# Patient Record
Sex: FEMALE | Race: WHITE | NOT HISPANIC OR LATINO | URBAN - METROPOLITAN AREA
[De-identification: names, ages, dates, MRNs, and addresses within clinical notes are randomized per-mention and may not be internally consistent; named-entity substitution may affect disease eponyms.]

---

## 2022-02-25 ENCOUNTER — APPOINTMENT (OUTPATIENT)
Dept: URBAN - METROPOLITAN AREA CLINIC 232 | Age: 77
Setting detail: DERMATOLOGY
End: 2022-02-25

## 2022-02-25 DIAGNOSIS — L71.0 PERIORAL DERMATITIS: ICD-10-CM

## 2022-02-25 PROCEDURE — 99203 OFFICE O/P NEW LOW 30 MIN: CPT

## 2022-02-25 PROCEDURE — OTHER MEDICATION COUNSELING: OTHER

## 2022-02-25 PROCEDURE — OTHER COUNSELING: OTHER

## 2022-02-25 PROCEDURE — OTHER MIPS QUALITY: OTHER

## 2022-02-25 PROCEDURE — OTHER TREATMENT REGIMEN: OTHER

## 2022-02-25 PROCEDURE — OTHER ADDITIONAL NOTES: OTHER

## 2022-02-25 PROCEDURE — OTHER PRESCRIPTION: OTHER

## 2022-02-25 RX ORDER — CLINDAMYCIN PHOSPHATE 10 MG/ML
SOLUTION TOPICAL
Qty: 60 | Refills: 3 | Status: ERX | COMMUNITY
Start: 2022-02-25

## 2022-02-25 RX ORDER — MINOCYCLINE HYDROCHLORIDE 100 MG/1
CAPSULE ORAL
Qty: 60 | Refills: 1 | Status: ERX | COMMUNITY
Start: 2022-02-25

## 2022-02-25 ASSESSMENT — LOCATION DETAILED DESCRIPTION DERM: LOCATION DETAILED: LEFT UPPER CUTANEOUS LIP

## 2022-02-25 ASSESSMENT — LOCATION ZONE DERM: LOCATION ZONE: LIP

## 2022-02-25 ASSESSMENT — LOCATION SIMPLE DESCRIPTION DERM: LOCATION SIMPLE: LEFT LIP

## 2022-02-25 NOTE — PROCEDURE: ADDITIONAL NOTES
Additional Notes: Discussed with patient possible causes could be mint,cinnamon, blue dyes or heavy cream.\\nRecommended to discontinue aquaphor for the face due to the cream being to heavy.\\nPatient states she has been consuming more cinnamon than before.
Detail Level: Detailed
Render Risk Assessment In Note?: no

## 2022-02-25 NOTE — HPI: RASH
What Type Of Note Output Would You Prefer (Optional)?: Standard Output
How Severe Is Your Rash?: mild
Is This A New Presentation, Or A Follow-Up?: Rash
Additional History: Patient ronnie has been to two separate dermatologist and have been told two separate diagnosis. Patient states she has used desonide cream and saw improvement but never healed from first dermatologist. Patient has LSC tried metronidazole for rosacea and made it worse.

## 2022-02-25 NOTE — PROCEDURE: TREATMENT REGIMEN
Initiate Treatment: clindamycin phosphate 1 % topical solution,Apply to the face twice daily\\nMinocycline 100 mg capsule,Take one tablet twice daily with a full meal
Continue Regimen: Metronidazole previously prescribed by previous dermatologist
Discontinue Regimen: Hydrocortisone previously prescribed by previous dermatologist
Detail Level: Zone

## 2022-03-10 ENCOUNTER — APPOINTMENT (OUTPATIENT)
Dept: URBAN - METROPOLITAN AREA CLINIC 232 | Age: 77
Setting detail: DERMATOLOGY
End: 2022-03-10

## 2022-03-10 DIAGNOSIS — L71.0 PERIORAL DERMATITIS: ICD-10-CM

## 2022-03-10 PROCEDURE — OTHER MIPS QUALITY: OTHER

## 2022-03-10 PROCEDURE — OTHER COUNSELING: OTHER

## 2022-03-10 PROCEDURE — OTHER ADDITIONAL NOTES: OTHER

## 2022-03-10 PROCEDURE — OTHER TREATMENT REGIMEN: OTHER

## 2022-03-10 PROCEDURE — 99213 OFFICE O/P EST LOW 20 MIN: CPT

## 2022-03-10 ASSESSMENT — LOCATION SIMPLE DESCRIPTION DERM: LOCATION SIMPLE: LEFT LIP

## 2022-03-10 ASSESSMENT — LOCATION ZONE DERM: LOCATION ZONE: LIP

## 2022-03-10 ASSESSMENT — LOCATION DETAILED DESCRIPTION DERM: LOCATION DETAILED: LEFT UPPER CUTANEOUS LIP

## 2022-03-10 NOTE — PROCEDURE: ADDITIONAL NOTES
Detail Level: Detailed
Additional Notes: Recommended after washing face to use products with hylaronic acid to help moisturize skin.\\nRecommended to avoid heavy creams and oils around mouth.
Render Risk Assessment In Note?: no

## 2022-03-10 NOTE — PROCEDURE: TREATMENT REGIMEN
May discuss health history with responsible party  
Detail Level: Zone
Discontinue Regimen: Minocycline 100 mg capsule due to pt feeling dizzy and nauseous \\nMetronidazole previously prescribed by previous dermatologist
Continue Regimen: clindamycin phosphate 1 % topical solution,Apply to the face twice daily
